# Patient Record
Sex: FEMALE | Race: WHITE | NOT HISPANIC OR LATINO | Employment: STUDENT | URBAN - NONMETROPOLITAN AREA
[De-identification: names, ages, dates, MRNs, and addresses within clinical notes are randomized per-mention and may not be internally consistent; named-entity substitution may affect disease eponyms.]

---
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
## 2024-02-05 DIAGNOSIS — N92.0 MENORRHAGIA WITH REGULAR CYCLE: ICD-10-CM

## 2024-02-06 RX ORDER — NORGESTIMATE AND ETHINYL ESTRADIOL 0.25-0.035
KIT ORAL
Qty: 84 TABLET | Refills: 0 | Status: SHIPPED | OUTPATIENT
Start: 2024-02-06 | End: 2024-05-03

## 2024-02-06 NOTE — TELEPHONE ENCOUNTER
[facility] sent Rx request for the following:      Requested Prescriptions   Pending Prescriptions Disp Refills    ESTARYLLA 0.25-35 MG-MCG tablet [Pharmacy Med Name: ESTARYLLA TABLETS 28S] 84 tablet 3     Sig: TAKE 1 TABLET BY MOUTH DAILY   Last Prescription Date:   3/8/23  Last Fill Qty/Refills:         84, R-3    Last Office Visit:              7/20/23   Future Office visit:           None    Prescription approved per [facility] Refill Protocol.    Rosa M Guzman RN .............. 2/6/2024  12:44 PM

## 2024-04-29 DIAGNOSIS — N92.0 MENORRHAGIA WITH REGULAR CYCLE: ICD-10-CM

## 2024-05-01 ENCOUNTER — MYC REFILL (OUTPATIENT)
Dept: FAMILY MEDICINE | Facility: OTHER | Age: 19
End: 2024-05-01
Payer: COMMERCIAL

## 2024-05-01 DIAGNOSIS — N92.0 MENORRHAGIA WITH REGULAR CYCLE: ICD-10-CM

## 2024-05-01 RX ORDER — NORGESTIMATE AND ETHINYL ESTRADIOL 0.25-0.035
1 KIT ORAL DAILY
Qty: 84 TABLET | Refills: 0 | Status: CANCELLED | OUTPATIENT
Start: 2024-05-01

## 2024-05-03 RX ORDER — NORGESTIMATE AND ETHINYL ESTRADIOL 0.25-0.035
1 KIT ORAL DAILY
Qty: 84 TABLET | Refills: 3 | Status: SHIPPED | OUTPATIENT
Start: 2024-05-03 | End: 2024-08-12

## 2024-05-03 NOTE — TELEPHONE ENCOUNTER
[facility] sent Rx request for the following:      Requested Prescriptions   Pending Prescriptions Disp Refills    norgestimate-ethinyl estradiol (ORTHO-CYCLEN) 0.25-35 MG-MCG tablet [Pharmacy Med Name: NORGEST/ETHI 0.25MG/35MCG TABS 28S] 84 tablet 0     Sig: TAKE 1 TABLET BY MOUTH DAILY       There is no refill protocol information for this order          Last Prescription Date:   2/6/24  Last Fill Qty/Refills:         84, R-0    Last Office Visit:              7/20/23   Future Office visit:            none    Routing refill request to provider for review/approval because:  Drug not on [place] [facility] [place]     Carmencita Mcleod RN on 5/3/2024 at 11:00 AM

## 2024-05-07 NOTE — TELEPHONE ENCOUNTER
Requested Prescriptions   Pending Prescriptions Disp Refills    norgestimate-ethinyl estradiol (ESTARYLLA) 0.25-35 MG-MCG tablet 84 tablet 0     Sig: Take 1 tablet by mouth daily     Approved 5/3/24. Pt notified. Rosa M Guzman RN .............. 5/7/2024  4:37 PM

## 2024-07-03 ENCOUNTER — DOCUMENTATION ONLY (OUTPATIENT)
Dept: OTHER | Facility: CLINIC | Age: 19
End: 2024-07-03
Payer: COMMERCIAL

## 2024-07-25 ENCOUNTER — OFFICE VISIT (OUTPATIENT)
Dept: OBGYN | Facility: OTHER | Age: 19
End: 2024-07-25
Payer: COMMERCIAL

## 2024-07-25 VITALS
OXYGEN SATURATION: 97 % | DIASTOLIC BLOOD PRESSURE: 76 MMHG | RESPIRATION RATE: 16 BRPM | WEIGHT: 144 LBS | SYSTOLIC BLOOD PRESSURE: 130 MMHG | BODY MASS INDEX: 20.66 KG/M2 | HEART RATE: 63 BPM

## 2024-07-25 DIAGNOSIS — Z30.430 ENCOUNTER FOR IUD INSERTION: Primary | ICD-10-CM

## 2024-07-25 LAB — HCG UR QL: NEGATIVE

## 2024-07-25 PROCEDURE — 58300 INSERT INTRAUTERINE DEVICE: CPT

## 2024-07-25 PROCEDURE — 81025 URINE PREGNANCY TEST: CPT | Mod: ZL

## 2024-07-25 PROCEDURE — 250N000011 HC RX IP 250 OP 636

## 2024-07-25 RX ORDER — CHLORHEXIDINE GLUCONATE ORAL RINSE 1.2 MG/ML
SOLUTION DENTAL
COMMUNITY
Start: 2024-07-16 | End: 2024-08-12

## 2024-07-25 RX ADMIN — LEVONORGESTREL 1 EACH: 52 INTRAUTERINE DEVICE INTRAUTERINE at 15:56

## 2024-07-25 ASSESSMENT — PAIN SCALES - GENERAL: PAINLEVEL: MILD PAIN (3)

## 2024-07-25 NOTE — PROGRESS NOTES
IUD Insertion Procedure Visit    SUBJECTIVE: Cheri Stauffer is a 18 year old female, requests Mirena. Last unprotected intercourse was >2 weeks ago.     Verification of Procedure:  Just before the procedure begans through verbal and active participation of team members, I verified:     Initials   Patient Name Cheri Stauffer    Patient  2005    Procedure to be performed IUD insertion     Consent:  Risks, benefits of treatment, and alternative options for contraception were discussed.  Patient's questions were elicited and answered.  Written consent was obtained and scanned into medical record.       OBJECTIVE: /76   Pulse 63   Resp 16   Wt 65.3 kg (144 lb)   LMP 2024 (Exact Date)   SpO2 97%   Breastfeeding No   BMI 20.66 kg/m      Pelvic:  Normal appearing external female genitalia. Normal hair distribution. Vagina is without lesions. small discharge. Cervix normal, no lesions, no cervical motion tenderness. Uterus is small, mobile, non-tender. No adnexal tenderness or masses    PROCEDURE NOTE  --  Mirena Insertion    Reason for Insertion:  contraception.    Pregnancy test: Negative    Counseling:  Patient counseled on efficacy, benefits, risks, potential side effects of IUD.  Insertion procedure and risk of infection, perforation, and spontaneous expulsion reviewed.   Advised to plan removal and/or replacement of IUD in 8 years from today or when desired.         Under sterile technique, cervix was visualized with a medium Graves speculum and prepped with Betadine solution. The uterus was sounded to 7.5 cm. The Mirena IUD insertion apparatus was prepared and placed through the cervix without significant resistance and deployed at the fundus in the usual fashion. The strings were trimmed 3 cm from the external os.      Device Lot #: [id number]   Device Expiration Date: 2026      EBL:  Minimal     Complications:  None      Cheri Stauffer tolerated procedure well.    PLAN:      Written  information on IUD use reviewed and given.  Symptoms to report reviewed. To report heavy bleeding, severe cramping, or abnormal vaginal discharge.  May take Ibuprofen 400-800 mg PO TID PRN or Naproxen 500 mg PO BID for cramping. Reminded to check for IUD strings every month. Patient has been counseled to use backup birth control method for 1 week.  Return to clinic in 4-6 weeks for string check. Cheri Stauffer  verbalized understanding of instructions.    Socorro ZAMAN. FNP-C  7/25/2024 3:43 PM

## 2024-07-25 NOTE — NURSING NOTE
"Chief Complaint   Patient presents with    IUD     placement   She is here today to talk about getting an IUD placement.  Lizy Moran LPN..................7/25/2024   2:57 PM      Initial /76   Pulse 63   Resp 16   Wt 65.3 kg (144 lb)   LMP 07/24/2024 (Exact Date)   SpO2 97%   Breastfeeding No   BMI 20.66 kg/m   Estimated body mass index is 20.66 kg/m  as calculated from the following:    Height as of 7/20/23: 1.778 m (5' 10\").    Weight as of this encounter: 65.3 kg (144 lb).  Medication Review: complete    The next two questions are to help us understand your food security.  If you are feeling you need any assistance in this area, we have resources available to support you today.           No data to display                  Health Care Directive:  Patient does not have a Health Care Directive or Living Will: Discussed advance care planning with patient; however, patient declined at this time.    Lizy Moran LPN      "

## 2024-08-12 ENCOUNTER — OFFICE VISIT (OUTPATIENT)
Dept: OBGYN | Facility: OTHER | Age: 19
End: 2024-08-12
Payer: COMMERCIAL

## 2024-08-12 VITALS
RESPIRATION RATE: 14 BRPM | HEART RATE: 75 BPM | OXYGEN SATURATION: 97 % | TEMPERATURE: 96.4 F | SYSTOLIC BLOOD PRESSURE: 122 MMHG | BODY MASS INDEX: 20.39 KG/M2 | WEIGHT: 142.1 LBS | DIASTOLIC BLOOD PRESSURE: 82 MMHG

## 2024-08-12 DIAGNOSIS — Z11.3 ROUTINE SCREENING FOR STI (SEXUALLY TRANSMITTED INFECTION): ICD-10-CM

## 2024-08-12 DIAGNOSIS — Z30.431 ENCOUNTER FOR ROUTINE CHECKING OF INTRAUTERINE CONTRACEPTIVE DEVICE (IUD): Primary | ICD-10-CM

## 2024-08-12 LAB
C TRACH DNA SPEC QL PROBE+SIG AMP: NEGATIVE
N GONORRHOEA DNA SPEC QL NAA+PROBE: NEGATIVE

## 2024-08-12 PROCEDURE — 99212 OFFICE O/P EST SF 10 MIN: CPT

## 2024-08-12 PROCEDURE — 87491 CHLMYD TRACH DNA AMP PROBE: CPT | Mod: ZL

## 2024-08-12 ASSESSMENT — PAIN SCALES - GENERAL: PAINLEVEL: NO PAIN (0)

## 2024-08-12 NOTE — PROGRESS NOTES
Follow-Up Visit    S: Ms. Cheri Stauffer is a 18 year old  here for IUD check. She had a Mirena placed on 24. She reports no cocnerns. sHe is happy with IUD.     O:  /82   Pulse 75   Temp (!) 96.4  F (35.8  C) (Tympanic)   Resp 14   Wt 64.5 kg (142 lb 1.6 oz)   LMP 2024 (Exact Date)   SpO2 97%   Breastfeeding No   BMI 20.39 kg/m    Gen: Well-appearing, NAD  Pulm: nonlabored  Psych: appropriate mood and affect    Pelvic:  Normal appearing external female genitalia. Normal hair distribution. Vagina is without lesions. Small brown discharge. Gc collected Cervix normal, IUD strings appear appropriate length    A/P:  Ms. Cheri Stauffer is a 18 year old  here for IUD check. I have reviewed allergies, medication list, problem list, and past medical history. Discussed expected bleeding patterns.   - RTC 1 year for IUD check or sooner prn    WENDY Klein  2024 10:28 AM

## 2024-08-12 NOTE — NURSING NOTE
"Chief Complaint   Patient presents with    IUD     1 month IUD check        Initial /82   Pulse 75   Temp (!) 96.4  F (35.8  C) (Tympanic)   Resp 14   Wt 64.5 kg (142 lb 1.6 oz)   LMP 07/24/2024 (Exact Date)   SpO2 97%   Breastfeeding No   BMI 20.39 kg/m   Estimated body mass index is 20.39 kg/m  as calculated from the following:    Height as of 7/20/23: 1.778 m (5' 10\").    Weight as of this encounter: 64.5 kg (142 lb 1.6 oz).  Medication Reconciliation: complete    Faby Hernandes CMA    "

## 2024-12-26 ENCOUNTER — TELEPHONE (OUTPATIENT)
Dept: OBGYN | Facility: OTHER | Age: 19
End: 2024-12-26
Payer: COMMERCIAL

## 2024-12-26 NOTE — TELEPHONE ENCOUNTER
Pt submitted a InhibOx appt request.  She put this in her note..    An IUD check up. I have been bleeding and having cramps pretty consistently for the past month and I m not sure if that s something I should be concerned about or not.     Please advise if this can wait until the next available appt or if it next to get worked in, ER or     Mary Lou Magaña on 12/26/2024 at 4:08 PM

## 2024-12-26 NOTE — TELEPHONE ENCOUNTER
Called and spoke to patient after verifying last name and date of birth. Patient states over a month ago she was bleeding everyday and now she is just spotting every other day. States it is darker brown and has a little bit of discharge that is darker brown. States she has cramps that is off and on. States the cramps are a 2-3/10 for pain. Informed patient that she may experience these symptoms and to monitor her symptoms. Patient states she hasn't felt for her strings. Informed patient if she would like to make an appointment she can. Patient declined and will reach out if anything else changes.     Tian Logan RN on 12/26/2024 at 4:36 PM

## 2025-05-17 ENCOUNTER — HEALTH MAINTENANCE LETTER (OUTPATIENT)
Age: 20
End: 2025-05-17